# Patient Record
Sex: FEMALE | Race: WHITE
[De-identification: names, ages, dates, MRNs, and addresses within clinical notes are randomized per-mention and may not be internally consistent; named-entity substitution may affect disease eponyms.]

---

## 2018-02-27 ENCOUNTER — HOSPITAL ENCOUNTER (EMERGENCY)
Dept: HOSPITAL 90 - EDH | Age: 73
Discharge: HOME | End: 2018-02-27
Payer: MEDICARE

## 2018-02-27 DIAGNOSIS — E78.5: ICD-10-CM

## 2018-02-27 DIAGNOSIS — I10: Primary | ICD-10-CM

## 2018-02-27 DIAGNOSIS — Z88.2: ICD-10-CM

## 2018-02-27 PROCEDURE — 71046 X-RAY EXAM CHEST 2 VIEWS: CPT

## 2018-02-27 PROCEDURE — 87804 INFLUENZA ASSAY W/OPTIC: CPT

## 2018-02-27 PROCEDURE — 93005 ELECTROCARDIOGRAM TRACING: CPT

## 2025-02-05 ENCOUNTER — HOSPITAL ENCOUNTER (EMERGENCY)
Dept: HOSPITAL 90 - EDH | Age: 80
Discharge: HOME | End: 2025-02-05
Payer: MEDICARE

## 2025-02-05 VITALS — WEIGHT: 154.98 LBS | HEIGHT: 64 IN | BODY MASS INDEX: 26.46 KG/M2

## 2025-02-05 VITALS
SYSTOLIC BLOOD PRESSURE: 148 MMHG | RESPIRATION RATE: 20 BRPM | TEMPERATURE: 98.2 F | HEART RATE: 63 BPM | OXYGEN SATURATION: 99 % | DIASTOLIC BLOOD PRESSURE: 70 MMHG

## 2025-02-05 DIAGNOSIS — S10.96XA: Primary | ICD-10-CM

## 2025-02-05 DIAGNOSIS — E78.00: ICD-10-CM
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
## 2025-02-05 DIAGNOSIS — M19.90: ICD-10-CM

## 2025-02-05 DIAGNOSIS — I10: ICD-10-CM
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
## 2025-02-05 DIAGNOSIS — L03.221: ICD-10-CM

## 2025-02-05 PROCEDURE — 99283 EMERGENCY DEPT VISIT LOW MDM: CPT

## 2025-02-05 NOTE — ERN
General


Chief Complaint:  Skin Rash/Abscess


Stated Complaint:  RASH


Time Seen by MD:  11:51





History of Present Illness


Initial Comments


9-year-old female who presents for a bug bite with some infection to the left 

neck.  She was bit a couple of days ago.  Since then she has had some swelling. 

No fevers or systemic illness.  No respiratory distress.  There is an 

approximately 2 in x 1 in in diameter erythematous area in the left neck.  She 

has been using over-the-counter antibiotic cream.


Allergies:  


Coded Allergies:  


     No Known Allergies (Unverified  Allergy, Unknown, 2/5/25)





Past Medical History


Past Medical History:  Arthritis, High Cholesterol, Hypertension


Past Surgical History:  None





ROS Dictation


CONSTITUTIONAL:  No chills, no fever, no weakness, no diaphoresis, no malaise.


HEAD/FACE:  No signs of trauma. 


EENT:  No eye pain, no blurred vision, no tearing, no double vision, no ear 

pain, no ear discharge, no nose pain, no nasal congestion, no throat pain, no 

throat swelling, no mouth pain. 


RESPIRATORY:  No cough, no orthopnea, no SOB, no stridor, no wheezing. 


CARDIOVASCULAR:  No chest pain, no edema, no palpitations, no syncope. 


GASTROINTESTINAL/ABDOMINAL:   No abdominal pain, no constipation, no diarrhea, 

no nausea, no vomiting. 


GENITOURINARY:  No abnormal discharge, no dysuria, no frequent urination, no 

hematuria. No complaints of pain in the genitals. 


MUSCULOSKELETAL:  No back pain, no gout, no joint pain, no joint swelling, no 

muscle pain, no muscle stiffness, no neck pain. 


INTEGUMENTARY:  Left neck cellulitis


NEUROLOGICAL/PSYCH:  No anxiety, not depressed, no emotional problem, no 

headache, no numbness, no pre-existing deficit, no history of seizures,  no 

tremors, no weakness. 


HEMATOLOGIC/LYMPHATIC:  Not anemic, no history of blood clots, no apparent 

bleeding, no bruising, glands not swollen.


All Systems Negative, Except as Noted.





Physical Exam


Physical Exam Dictation


VITAL SIGNS:  Reviewed. 


GENERAL APPEARANCE:  Alert, oriented x3, no acute distress


HEAD AND FACE: Non-traumatic. 


EYES:   PERRL, pink conjunctivas, eyelid no trauma, anterior chamber clear. 


EARS:  Pinnas intact and no signs of trauma or erythema.  Ear canals clear and 

no discharge. TMs no erythema. 


NOSE:  No discharge, no bleeding. 


OROPHARYNX:  Mouth normal, teeth no caries, tongue pink.  Pharynx clear, no 

erythema.  Tonsils no exudates, no abscesses noted. Mucous membrane moist.


NECK:  Supple, non-tender, no thyromegaly, no masses, no JVD, no bruits. 


BREAST:  Deferred.


CHEST:   No tenderness, no crepitus, no paradoxical movement, no retractions.


LUNGS:  Clear, well-ventilated, symmetric, no rales, no wheezing, no rhonchi, no

stridor, good breath sounds bilaterally. 


HEART:  Regular rate, regular rhythm, no murmur, no gallops. 


VASCULAR: No peripheral edema.


ABDOMEN: Soft, positive bowel sounds, nondistended, no guarding, nontender, no 

rebound, no masses no hepatomegaly, no splenomegaly, no Judge's sign, no 

hernias. 


RECTAL: Deferred. 


GENITAL:  Deferred. 


NEUROLOGICAL:  Normal speech, gross motor function intact, gross sensory 

function intact.


MUSCULOSKELETAL:  Neck nontender, full range of motion, back nontender, full 

range of motion.


EXTREMITIES: Nontender, full range of motion. 


SKIN:  Color pink, dry, no turgor, no rash, no lacerations, no abrasions, no 

contusions.  Left neck areas cellulitis as described in the HPI


LYMPHATICS:  Deferred.





[facility]


CC:  Left neck cellulitis 


Historian:  Patient 


Comorbidities: Advanced age 


Limitations by social determinants of health:  None


Differential diagnosis:  Cellulitis versus abscess versus allergic reaction.  


Vital signs are stable 


On clinical exam appears to be a bit of cellulitis.  Does not appear to be 

allergic.  There was no airway compromise with clear lungs.  There does not 

appear to be an abscess based on clinical exam.  If there is it is very small, 

no indication for imaging at this time.


Plan:  We will DC with Augmentin and steroids recommend PCP follow up.


ED Course





Vital Signs








  Date Time  Temp Pulse Resp B/P (MAP) Pulse Ox O2 Delivery O2 Flow Rate FiO2


 


2/5/25 11:59 98.2 63 20 148/70 99 Room Air  











DX & DISP


Disposition:  Discharge


Departure


Impression:  


   Primary Impression:  Cellulitis, neck


   Additional Impression:  Bug bite


Condition:  Stable


Scripts


Prednisone (Prednisone) 20 Mg Tablet


1 TAB PO BID for 3 Days, #6 TAB 0 Refills


   Prov: LILLIAM DURAN DO         2/5/25 


Amoxicillin/Potassium Clav (Amox Tr-K Clv 875-125 mg Tab) 875 Mg-125 Mg Tablet


1 TAB PO BID for 10 Days, #20 TAB 0 Refills


   Prov: LILLIAM DURAN DO         2/5/25





Additional Instructions:  


Your clinical exam is consistent with an infected bug bite.  





I have prescribed Augmentin, which is an antibiotic.  Please take as prescribed.

 You can continue applying the antibiotic ointment as well.  





I have also prescribed a course of steroids (prednisone) to reduce inflammation.

 





You can take over-the-counter Tylenol or ibuprofen as needed for pain or 

discomfort.  You can also use ice packs or warm compresses as needed for pain or

discomfort.  





Monitor for 48-72 hours with the treatment that I have provided.  If it appears 

to be worsening I recommend following up with a health care provider.  





Please return to the emergency department as needed.


Referrals:  


SELF,REFERRAL (PCP)











LILLIAM DURAN DO                 Feb 5, 2025 12:12